# Patient Record
Sex: MALE | Race: OTHER | HISPANIC OR LATINO | URBAN - METROPOLITAN AREA
[De-identification: names, ages, dates, MRNs, and addresses within clinical notes are randomized per-mention and may not be internally consistent; named-entity substitution may affect disease eponyms.]

---

## 2023-11-28 ENCOUNTER — EMERGENCY (EMERGENCY)
Facility: HOSPITAL | Age: 58
LOS: 1 days | Discharge: HOME | End: 2023-11-28
Attending: EMERGENCY MEDICINE | Admitting: EMERGENCY MEDICINE
Payer: SELF-PAY

## 2023-11-28 VITALS
WEIGHT: 169.98 LBS | HEART RATE: 72 BPM | OXYGEN SATURATION: 99 % | HEIGHT: 64 IN | RESPIRATION RATE: 19 BRPM | DIASTOLIC BLOOD PRESSURE: 58 MMHG | SYSTOLIC BLOOD PRESSURE: 137 MMHG | TEMPERATURE: 99 F

## 2023-11-28 PROCEDURE — 99053 MED SERV 10PM-8AM 24 HR FAC: CPT

## 2023-11-28 PROCEDURE — 99285 EMERGENCY DEPT VISIT HI MDM: CPT

## 2023-11-28 NOTE — ED ADULT NURSE NOTE - NSFALLUNIVINTERV_ED_ALL_ED
Bed/Stretcher in lowest position, wheels locked, appropriate side rails in place/Call bell, personal items and telephone in reach/Instruct patient to call for assistance before getting out of bed/chair/stretcher/Non-slip footwear applied when patient is off stretcher/[place] to call system/Physically safe environment - no spills, clutter or unnecessary equipment/Purposeful proactive rounding/Room/bathroom lighting operational, light cord in reach Bed/Stretcher in lowest position, wheels locked, appropriate side rails in place/Call bell, personal items and telephone in reach/Instruct patient to call for assistance before getting out of bed/chair/stretcher/Non-slip footwear applied when patient is off stretcher/[place] to call system/Physically safe environment - no spills, clutter or unnecessary equipment/Purposeful proactive rounding/Room/bathroom lighting operational, light cord in reach Bed/Stretcher in lowest position, wheels locked, appropriate side rails in place/Call bell, personal items and telephone in reach/Instruct patient to call for assistance before getting out of bed/chair/stretcher/Non-slip footwear applied when patient is off stretcher/[place] to call system/Physically safe environment - no spills, clutter or unnecessary equipment/Purposeful proactive rounding/Room/bathroom lighting operational, light cord in reach

## 2023-11-28 NOTE — ED ADULT TRIAGE NOTE - CHIEF COMPLAINT QUOTE
Pt c/o left third finger laceration/injury while at the gym, stated finger got caught between 2 6 lb weights

## 2023-11-29 VITALS
HEART RATE: 77 BPM | SYSTOLIC BLOOD PRESSURE: 132 MMHG | RESPIRATION RATE: 19 BRPM | DIASTOLIC BLOOD PRESSURE: 62 MMHG | OXYGEN SATURATION: 98 % | TEMPERATURE: 98 F

## 2023-11-29 PROCEDURE — 90471 IMMUNIZATION ADMIN: CPT

## 2023-11-29 PROCEDURE — 99283 EMERGENCY DEPT VISIT LOW MDM: CPT | Mod: 25

## 2023-11-29 PROCEDURE — 73140 X-RAY EXAM OF FINGER(S): CPT

## 2023-11-29 PROCEDURE — 90715 TDAP VACCINE 7 YRS/> IM: CPT

## 2023-11-29 PROCEDURE — 73140 X-RAY EXAM OF FINGER(S): CPT | Mod: 26,LT

## 2023-11-29 RX ORDER — IBUPROFEN 200 MG
600 TABLET ORAL ONCE
Refills: 0 | Status: COMPLETED | OUTPATIENT
Start: 2023-11-29 | End: 2023-11-29

## 2023-11-29 RX ORDER — IBUPROFEN 200 MG
1 TABLET ORAL
Qty: 30 | Refills: 0
Start: 2023-11-29

## 2023-11-29 RX ORDER — TETANUS TOXOID, REDUCED DIPHTHERIA TOXOID AND ACELLULAR PERTUSSIS VACCINE, ADSORBED 5; 2.5; 8; 8; 2.5 [IU]/.5ML; [IU]/.5ML; UG/.5ML; UG/.5ML; UG/.5ML
0.5 SUSPENSION INTRAMUSCULAR ONCE
Refills: 0 | Status: COMPLETED | OUTPATIENT
Start: 2023-11-29 | End: 2023-11-29

## 2023-11-29 RX ADMIN — Medication 1 TABLET(S): at 02:42

## 2023-11-29 RX ADMIN — TETANUS TOXOID, REDUCED DIPHTHERIA TOXOID AND ACELLULAR PERTUSSIS VACCINE, ADSORBED 0.5 MILLILITER(S): 5; 2.5; 8; 8; 2.5 SUSPENSION INTRAMUSCULAR at 00:51

## 2023-11-29 RX ADMIN — Medication 600 MILLIGRAM(S): at 02:42

## 2023-11-29 RX ADMIN — Medication 600 MILLIGRAM(S): at 00:51

## 2023-11-29 NOTE — ED PROVIDER NOTE - PHYSICAL EXAMINATION
exam:   General: well appearing, NAD.   HEENT: eyes perrl, nose normal,   cor: RRR, s1s2, 2+rad pulses.   lungs: ctabl, no resp distress.   neuro: a&ox3, cn2-12 intact, DURHAM, 5/5 strength c nl sensation all extremities, nl coordination.   Skin: Left third finger with skin defect/macerated laceration palmar side of mid phalanx with gaping laceration extending to distal phalanx.  About 8 mm at widest with soft tissue swelling, unable to approximate.  Moderate tenderness distal phalanx.  Positive gross distal sensation.  Able to wiggle/flex DIP PIP, limited by pain and swelling

## 2023-11-29 NOTE — ED PROVIDER NOTE - OBJECTIVE STATEMENT
58-year-old male with no significant medical history complaining of left third finger laceration and injury after dropping a weight on his finger while working out in the gym few hours ago.  Associated with pain and bleeding.  Patient is right-hand dominant.  Last Tdap unknown

## 2023-11-29 NOTE — ED PROVIDER NOTE - CARE PROVIDER_API CALL
Ezekiel Zepeda  Plastic Surgery  [address] [place] [address]  Phone: [phone]  Fax: [phone]  Follow Up Time: Urgent   Ezekiel Zepeda  Plastic Surgery  [address] [place] [address]  Phone: [phone]  Fax: [phone]  Follow Up Time: Urgent   Ezekiel Zepeda  Plastic Surgery  [address] [place] [address]  Phone: [phone]  Fax: [phone]  Follow Up Time: Urgent

## 2023-11-29 NOTE — ED PROVIDER NOTE - PATIENT PORTAL LINK FT
You can access the FollowMyHealth Patient Portal offered by [facility] by registering at the following website: [email or web address]. By joining [employer]’s FollowMyHealth portal, you will also be able to view your health information using other applications (apps) compatible with our system. You can access the FollowMyHealth Patient Portal offered by [facility] by registering at the following website: [email or web address]. By joining [employer]’s FollowMyHealth portal, you will also be able to view your health information using other applications (apps) compatible with our system. You can access the FollowMyHealth Patient Portal offered by [facility] by registering at the following website: [email or web address]. By joining [employer]’s FollowMyHealth portal, you will also be able to view your health information using other applications (apps) compatible with our system.

## 2023-11-29 NOTE — ED PROVIDER NOTE - CONSULTANT FREE TEXT FOR MDM DISCUSSED CASE WITH QUESTION
case d/w Dr Zepeda (hand). informed him of comminuted distal phalanx fx and gaping macerated wound with swelling, unable to close skin. he recommend xeroform dressing, splint, f/u in office tomorrow. ok with abx.

## 2023-11-29 NOTE — ED PROVIDER NOTE - NSFOLLOWUPINSTRUCTIONS_ED_ALL_ED_FT
-Follow-up with Dr. Zepeda or other hand surgeon in the next 1 to 2 days  -Take ibuprofen 400 to 600 mg every 6 hours as needed for pain  -Take Augmentin antibiotic twice daily as directed  -Keep finger in splint  -Return to the ER for worsening pain or swelling, numbness, fevers, pus or other concerns  - you were given Tdap (tetanus) booster vaccination today    or you can see doctor at [facility] Plastic Surgery clinic:  call [phone]      Tdap and Td Vaccines for Adults    WHAT YOU NEED TO KNOW:    Tdap is a shot given to protect you and others around you from tetanus, diphtheria, and pertussis. Td is a shot given to protect you from tetanus and diphtheria.    DISCHARGE INSTRUCTIONS:    Call your local emergency number (412 in the [place]) if:     Your mouth and throat are swollen.      You are wheezing or have trouble breathing.      You have chest pain or your heart is beating faster than usual.    Call your doctor if:     You have severe pain, redness, and swelling in your arm where the shot was given.      Your face is red or swollen.      You have hives that spread over your body.      You feel weak or dizzy.      You have a fever or chills.      You have a headache, body aches, or joint pain.      You have nausea or diarrhea, or you are vomiting.      You have questions or concerns about the vaccine.    Apply a warm compress to the injection area as directed to decrease pain and swelling.    Follow up with your doctor as directed: Write down your questions so you remember to ask them during your visits. -Follow-up with Dr. Zepeda or other hand surgeon in the next 1 to 2 days  -Take ibuprofen 400 to 600 mg every 6 hours as needed for pain  -Take Augmentin antibiotic twice daily as directed  -Keep finger in splint  -Return to the ER for worsening pain or swelling, numbness, fevers, pus or other concerns  - you were given Tdap (tetanus) booster vaccination today    or you can see doctor at [facility] Plastic Surgery clinic:  call [phone]      Tdap and Td Vaccines for Adults    WHAT YOU NEED TO KNOW:    Tdap is a shot given to protect you and others around you from tetanus, diphtheria, and pertussis. Td is a shot given to protect you from tetanus and diphtheria.    DISCHARGE INSTRUCTIONS:    Call your local emergency number ([phone] in the [place]) if:     Your mouth and throat are swollen.      You are wheezing or have trouble breathing.      You have chest pain or your heart is beating faster than usual.    Call your doctor if:     You have severe pain, redness, and swelling in your arm where the shot was given.      Your face is red or swollen.      You have hives that spread over your body.      You feel weak or dizzy.      You have a fever or chills.      You have a headache, body aches, or joint pain.      You have nausea or diarrhea, or you are vomiting.      You have questions or concerns about the vaccine.    Apply a warm compress to the injection area as directed to decrease pain and swelling.    Follow up with your doctor as directed: Write down your questions so you remember to ask them during your visits. -Follow-up with Dr. Zepeda or other hand surgeon in the next 1 to 2 days  -Take ibuprofen 400 to 600 mg every 6 hours as needed for pain  -Take Augmentin antibiotic twice daily as directed  -Keep finger in splint  -Return to the ER for worsening pain or swelling, numbness, fevers, pus or other concerns  - you were given Tdap (tetanus) booster vaccination today    or you can see doctor at [facility] Plastic Surgery clinic:  call [phone]      Tdap and Td Vaccines for Adults    WHAT YOU NEED TO KNOW:    Tdap is a shot given to protect you and others around you from tetanus, diphtheria, and pertussis. Td is a shot given to protect you from tetanus and diphtheria.    DISCHARGE INSTRUCTIONS:    Call your local emergency number (097 in the [place]) if:     Your mouth and throat are swollen.      You are wheezing or have trouble breathing.      You have chest pain or your heart is beating faster than usual.    Call your doctor if:     You have severe pain, redness, and swelling in your arm where the shot was given.      Your face is red or swollen.      You have hives that spread over your body.      You feel weak or dizzy.      You have a fever or chills.      You have a headache, body aches, or joint pain.      You have nausea or diarrhea, or you are vomiting.      You have questions or concerns about the vaccine.    Apply a warm compress to the injection area as directed to decrease pain and swelling.    Follow up with your doctor as directed: Write down your questions so you remember to ask them during your visits.

## 2023-11-29 NOTE — ED PROVIDER NOTE - IV ALTEPLASE INCLUSION HIDDEN
show Dutasteride Pregnancy And Lactation Text: This medication is absolutely contraindicated in women, especially during pregnancy and breast feeding. Feminization of male fetuses is possible if taking while pregnant.

## 2023-11-30 PROBLEM — Z00.00 ENCOUNTER FOR PREVENTIVE HEALTH EXAMINATION: Status: ACTIVE | Noted: 2023-11-30

## 2023-12-04 ENCOUNTER — OUTPATIENT (OUTPATIENT)
Dept: OUTPATIENT SERVICES | Facility: HOSPITAL | Age: 58
LOS: 1 days | End: 2023-12-04
Payer: SELF-PAY

## 2023-12-04 ENCOUNTER — APPOINTMENT (OUTPATIENT)
Dept: FAMILY MEDICINE | Facility: HOSPITAL | Age: 58
End: 2023-12-04

## 2023-12-04 VITALS
SYSTOLIC BLOOD PRESSURE: 133 MMHG | RESPIRATION RATE: 12 BRPM | TEMPERATURE: 98.2 F | HEIGHT: 62 IN | OXYGEN SATURATION: 98 % | DIASTOLIC BLOOD PRESSURE: 75 MMHG | WEIGHT: 180 LBS | BODY MASS INDEX: 33.13 KG/M2 | HEART RATE: 60 BPM

## 2023-12-04 DIAGNOSIS — Z00.00 ENCOUNTER FOR GENERAL ADULT MEDICAL EXAMINATION WITHOUT ABNORMAL FINDINGS: ICD-10-CM

## 2023-12-04 DIAGNOSIS — S62.639B DISPLACED FRACTURE OF DISTAL PHALANX OF UNSPECIFIED FINGER, INITIAL ENCOUNTER FOR OPEN FRACTURE: ICD-10-CM
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
## 2023-12-04 DIAGNOSIS — Z78.9 OTHER SPECIFIED HEALTH STATUS: ICD-10-CM

## 2023-12-04 PROCEDURE — G0463: CPT

## 2023-12-05 ENCOUNTER — NON-APPOINTMENT (OUTPATIENT)
Age: 58
End: 2023-12-05

## 2023-12-06 ENCOUNTER — OUTPATIENT (OUTPATIENT)
Dept: OUTPATIENT SERVICES | Facility: HOSPITAL | Age: 58
LOS: 1 days | End: 2023-12-06
Payer: MEDICAID

## 2023-12-06 ENCOUNTER — APPOINTMENT (OUTPATIENT)
Dept: ORTHOPEDIC SURGERY | Facility: HOSPITAL | Age: 58
End: 2023-12-06

## 2023-12-06 VITALS
HEIGHT: 62 IN | HEART RATE: 66 BPM | TEMPERATURE: 97.4 F | DIASTOLIC BLOOD PRESSURE: 79 MMHG | RESPIRATION RATE: 14 BRPM | SYSTOLIC BLOOD PRESSURE: 149 MMHG | WEIGHT: 180 LBS | BODY MASS INDEX: 33.13 KG/M2
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
## 2023-12-06 DIAGNOSIS — M79.609 PAIN IN UNSPECIFIED LIMB: ICD-10-CM

## 2023-12-06 PROCEDURE — G0463: CPT

## 2023-12-08 DIAGNOSIS — S62.639B DISPLACED FRACTURE OF DISTAL PHALANX OF UNSPECIFIED FINGER, INITIAL ENCOUNTER FOR OPEN FRACTURE: ICD-10-CM

## 2023-12-13 ENCOUNTER — APPOINTMENT (OUTPATIENT)
Dept: FAMILY MEDICINE | Facility: HOSPITAL | Age: 58
End: 2023-12-13

## 2023-12-13 ENCOUNTER — OUTPATIENT (OUTPATIENT)
Dept: OUTPATIENT SERVICES | Facility: HOSPITAL | Age: 58
LOS: 1 days | End: 2023-12-13
Payer: SELF-PAY

## 2023-12-13 VITALS
BODY MASS INDEX: 32.01 KG/M2 | RESPIRATION RATE: 16 BRPM | OXYGEN SATURATION: 96 % | DIASTOLIC BLOOD PRESSURE: 77 MMHG | WEIGHT: 175 LBS | TEMPERATURE: 98.3 F | HEART RATE: 86 BPM | SYSTOLIC BLOOD PRESSURE: 131 MMHG

## 2023-12-13 DIAGNOSIS — S62.639B DISPLACED FRACTURE OF DISTAL PHALANX OF UNSPECIFIED FINGER, INITIAL ENCOUNTER FOR OPEN FRACTURE: ICD-10-CM
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
## 2023-12-13 DIAGNOSIS — Z00.00 ENCOUNTER FOR GENERAL ADULT MEDICAL EXAMINATION WITHOUT ABNORMAL FINDINGS: ICD-10-CM

## 2023-12-13 PROCEDURE — G0463: CPT

## 2023-12-13 PROCEDURE — 87070 CULTURE OTHR SPECIMN AEROBIC: CPT

## 2023-12-13 PROCEDURE — 87186 SC STD MICRODIL/AGAR DIL: CPT

## 2023-12-13 PROCEDURE — 87077 CULTURE AEROBIC IDENTIFY: CPT

## 2023-12-14 PROBLEM — S62.639B FRACTURE, FINGER, DISTAL PHALANX, OPEN: Status: ACTIVE | Noted: 2023-12-04

## 2023-12-16 ENCOUNTER — NON-APPOINTMENT (OUTPATIENT)
Age: 58
End: 2023-12-16

## 2023-12-16 RX ORDER — SULFAMETHOXAZOLE AND TRIMETHOPRIM 800; 160 MG/1; MG/1
800-160 TABLET ORAL TWICE DAILY
Qty: 20 | Refills: 0 | Status: ACTIVE | COMMUNITY
Start: 2023-12-16 | End: 1900-01-01

## 2023-12-16 RX ORDER — CEPHALEXIN 500 MG/1
500 CAPSULE ORAL 3 TIMES DAILY
Qty: 30 | Refills: 0 | Status: DISCONTINUED | COMMUNITY
Start: 2023-12-13 | End: 2023-12-16

## 2023-12-16 RX ORDER — AMOXICILLIN AND CLAVULANATE POTASSIUM 875; 125 MG/1; MG/1
875-125 TABLET, COATED ORAL
Qty: 14 | Refills: 0 | Status: ACTIVE | COMMUNITY
Start: 2023-12-16 | End: 1900-01-01

## 2023-12-19 LAB — BACTERIA SPEC CULT: ABNORMAL

## 2023-12-19 NOTE — HISTORY OF PRESENT ILLNESS
[FreeTextEntry1] : f/u wound [de-identified] : 58M w/ left middle finger extra-articular distal phalanx fx presents for f/u wound.  Pt had comminuted fracture of left third distal phalanx after dropping a weight on his finger while working out in the gym on 11/29/2023, s/p 3d of Augmentin- did not finish course d/t misplacing medications. He had a visit to ortho on 12/6/23, advised NATHAN RUIZ in splint, XR, and f/u hand clinic in 2 weeks. Pain control.   Pt is able to move finger. Denies numbness, tingling, pain. Pain only when touched. States changes his dressing daily. Pt has ortho appt scheduled for 12/20/23.

## 2023-12-19 NOTE — PHYSICAL EXAM
[Normal] : normal rate, regular rhythm, normal S1 and S2 and no murmur heard [de-identified] : distal portion of middle finger on L hand with swelling, draining thick yellowish-white fluid, tender to touch. Able to move finger but limited by pain, wound remains open

## 2023-12-20 ENCOUNTER — APPOINTMENT (OUTPATIENT)
Dept: ORTHOPEDIC SURGERY | Facility: HOSPITAL | Age: 58
End: 2023-12-20

## 2023-12-21 ENCOUNTER — APPOINTMENT (OUTPATIENT)
Dept: FAMILY MEDICINE | Facility: HOSPITAL | Age: 58
End: 2023-12-21